# Patient Record
Sex: MALE | Race: WHITE | URBAN - METROPOLITAN AREA
[De-identification: names, ages, dates, MRNs, and addresses within clinical notes are randomized per-mention and may not be internally consistent; named-entity substitution may affect disease eponyms.]

---

## 2017-04-18 ENCOUNTER — AMBULATORY - HEALTHEAST (OUTPATIENT)
Dept: HEALTH INFORMATION MANAGEMENT | Facility: CLINIC | Age: 37
End: 2017-04-18

## 2017-04-20 ASSESSMENT — MIFFLIN-ST. JEOR: SCORE: 2673.73

## 2017-04-21 ENCOUNTER — ANESTHESIA - HEALTHEAST (OUTPATIENT)
Dept: SURGERY | Facility: HOSPITAL | Age: 37
End: 2017-04-21

## 2017-04-21 ENCOUNTER — SURGERY - HEALTHEAST (OUTPATIENT)
Dept: SURGERY | Facility: HOSPITAL | Age: 37
End: 2017-04-21

## 2017-11-25 ENCOUNTER — HOSPITAL ENCOUNTER (EMERGENCY)
Facility: CLINIC | Age: 37
Discharge: HOME | End: 2017-11-25
Attending: NURSE PRACTITIONER | Admitting: NURSE PRACTITIONER
Payer: COMMERCIAL

## 2017-11-25 ENCOUNTER — APPOINTMENT (OUTPATIENT)
Dept: GENERAL RADIOLOGY | Facility: CLINIC | Age: 37
End: 2017-11-25
Attending: NURSE PRACTITIONER
Payer: COMMERCIAL

## 2017-11-25 VITALS
RESPIRATION RATE: 16 BRPM | OXYGEN SATURATION: 97 % | DIASTOLIC BLOOD PRESSURE: 101 MMHG | SYSTOLIC BLOOD PRESSURE: 160 MMHG | TEMPERATURE: 98.2 F

## 2017-11-25 DIAGNOSIS — M79.662 PAIN OF LEFT LOWER LEG: ICD-10-CM

## 2017-11-25 PROCEDURE — 99213 OFFICE O/P EST LOW 20 MIN: CPT | Performed by: NURSE PRACTITIONER

## 2017-11-25 PROCEDURE — 99213 OFFICE O/P EST LOW 20 MIN: CPT

## 2017-11-25 PROCEDURE — 73590 X-RAY EXAM OF LOWER LEG: CPT | Mod: LT

## 2017-11-25 NOTE — DISCHARGE INSTRUCTIONS
Rest leg as much as possible.  Elevation.  Ice.  Ibuprofen 600mg every 6-8 hours as needed for pain. Take with food.  If not improving in 3-5 days you should have this rechecked, or sooner if pain becomes severe.

## 2017-11-25 NOTE — ED AVS SNAPSHOT
[facility] Emergency Department    [address] [facility] [address]    Phone:  [phone]    Fax:  [phone]                                       Jose Plummer   MRN: [id number]    Department:  [facility] Emergency Department   Date of Visit:  11/25/2017           Patient Information     Date Of Birth          1980        Your diagnoses for this visit were:     Pain of left lower leg        You were seen by Flor Gamino APRN CNP.      Follow-up Information     Follow up with Jun Thakkar    Specialty:  Internal Medicine    Why:  As needed    Contact information:    [facility]  [address]  [facility] [address]  [phone]          Follow up with [facility] Emergency Department.    Specialty:  EMERGENCY MEDICINE    Why:  If symptoms worsen    Contact information:    [address] [place] [address] [id number] [phone]    Additional information:    The medical center is located at   [address] [facility] ([address] in [place], [place] miles north   of [place]).        Discharge Instructions       Rest leg as much as possible.  Elevation.  Ice.  Ibuprofen 600mg every 6-8 hours as needed for pain. Take with food.  If not improving in 3-5 days you should have this rechecked, or sooner if pain becomes severe.      24 Hour Appointment Hotline       To make an appointment at any [facility] clinic, call [phone] ([phone]). If you don't have a family doctor or clinic, we will help you find one. [facility] clinics are conveniently located to serve the needs of you and your family.             Review of your medicines      Our records show that you are taking the medicines listed below. If these are incorrect, please call your family doctor or clinic.        Dose / Directions Last dose taken    * DULCOLAX STOOL SOFTENER 100 MG capsule   Dose:  100 mg   Generic drug:  docusate sodium        Take 100 mg by mouth 2 times  daily as needed.   Refills:  0        * COLACE 100 MG capsule   Generic drug:  docusate sodium        1 CAPSULE WITH EVERY VICODIN   Refills:  0        oxyCODONE-acetaminophen 5-325 MG per tablet   Commonly known as:  PERCOCET   Dose:  1-2 tablet   Quantity:  40 tablet        Take 1-2 tablets by mouth every 4 hours as needed for pain.   Refills:  0        PROCTOFOAM 1 % foam   Generic drug:  pramoxine        4 TIMES DAILY RECTALLY   Refills:  0        VICODIN ES 7.5-750 MG per tablet   Generic drug:  HYDROcodone-acetaminophen        1 TABLET EVERY 8 HOURS   Refills:  0        * Notice:  This list has 2 medication(s) that are the same as other medications prescribed for you. Read the directions carefully, and ask your doctor or other care provider to review them with you.            Procedures and tests performed during your visit     Tib/Fib XR, left      Orders Needing Specimen Collection     None      Pending Results     No orders found from 11/23/2017 to 11/26/2017.            Pending Culture Results     No orders found from 11/23/2017 to 11/26/2017.            Pending Results Instructions     If you had any lab results that were not finalized at the time of your Discharge, you can call the ED Lab Result RN at [phone]. You will be contacted by this team for any positive Lab results or changes in treatment. The nurses are available 7 days a week from 10A to 6:30P.  You can leave a message 24 hours per day and they will return your call.        Test Results From Your Hospital Stay        11/25/2017  2:18 PM      Narrative     XR TIBIA & FIBULA LT 2 VW 11/25/2017 2:11 PM     HISTORY: slid down fire pole, came down on left leg hard. pain  anterior-mid tib/fib;     COMPARISON: None        Impression     IMPRESSION: No acute fracture is seen. There is chronic appearing  deformity of the midshaft of the fibula likely due to old trauma.    ESTEFANI WOODWARD MD                Thank you for choosing [facility]       Thank  "you for choosing [facility] for your care. Our goal is always to provide you with excellent care. Hearing back from our patients is one way we can continue to improve our services. Please take a few minutes to complete the written survey that you may receive in the mail after you visit with us. Thank you!        [employer] Information     [employer] lets you send messages to your doctor, view your test results, renew your prescriptions, schedule appointments and more. To sign up, go to [email or web address] . Click on \"Log in\" on the left side of the screen, which will take you to the Welcome page. Then click on \"Sign up Now\" on the right side of the page.     You will be asked to enter the access code listed below, as well as some personal information. Please follow the directions to create your username and password.     Your access code is: [email or web address]  Expires: 2018  2:25 PM     Your access code will  in 90 days. If you need help or a new code, please call your [facility] clinic or [phone].        Care EveryWhere ID     This is your Care EveryWhere ID. This could be used by other organizations to access your [facility] medical records  [id number]        Equal Access to Services     REYNOLD REMY : Jude irizarryo Solesly, waaxda luqadaha, qaybta kaalmada adeegyada, sheila lópez. So [facility] [phone].    ATENCIÓN: Si habla español, tiene a mcgowan disposición servicios gratuitos de asistencia lingüística. Llame al [phone].    We comply with applicable federal civil rights laws and [place] laws. We do not discriminate on the basis of race, color, national origin, age, disability, sex, sexual orientation, or gender identity.            After Visit Summary       This is your record. Keep this with you and show to your community pharmacist(s) and doctor(s) at your next visit.                  "

## 2017-11-25 NOTE — ED PROVIDER NOTES
History     Chief Complaint   Patient presents with     Leg Pain     Pt sliding down fire pole last night and landed hard on L lower leg.       HPI  Jose Plummer is a 37 year old male who presents to urgent care for evaluation of pain in the mid shaft of his left lower leg.  Patient was sliding down a fire pole last night.  He describes having his right leg wrapped around the pole and when he slid down he landed hard with all of his weight onto his left leg.  He had immediate pain in the middle aspect of his left lower leg.  He has been able to bear weight since that time.  Patient has a history of a previous fracture to this leg several years ago.    Problem List:    There are no active problems to display for this patient.       Past Medical History:    No past medical history on file.    Past Surgical History:    No past surgical history on file.    Family History:    No family history on file.    Social History:  Marital Status:  Single [1]  Social History   Substance Use Topics     Smoking status: Not on file     Smokeless tobacco: Not on file     Alcohol use Not on file        Medications:      docusate sodium (DULCOLAX STOOL SOFTENER) 100 MG capsule   oxycodone-acetaminophen (PERCOCET) 5-325 MG per tablet   PROCTOFOAM 1 % RE FOAM   VICODIN ES 7.5-750 MG PO TABS   COLACE 100 MG PO CAPS         Review of Systems  As mentioned above in the history present illness. All other systems were reviewed and are negative.    Physical Exam   BP: (!) 160/101  Heart Rate: 98  Temp: 98.2  F (36.8  C)  Resp: 16  SpO2: 97 %      Physical Exam  Appearance: Alert, oriented, in no acute distress.  Left leg: No significant swelling, or overlying skin changes.  Tenderness over the anterior mid shaft (tib-fib).  No tenderness of the thigh, knee, ankle, or foot.  Normal range of motion.  Neurovascularly intact.  ED Course     ED Course     Procedures           Results for orders placed or performed during the hospital  encounter of 11/25/17 (from the past 24 hour(s))   Tib/Fib XR, left    Narrative    XR TIBIA & FIBULA LT 2 VW 11/25/2017 2:11 PM     HISTORY: slid down fire pole, came down on left leg hard. pain  anterior-mid tib/fib;     COMPARISON: None      Impression    IMPRESSION: No acute fracture is seen. There is chronic appearing  deformity of the midshaft of the fibula likely due to old trauma.    ESTEFANI WOODWARD MD       Labs Ordered and Resulted from Time of ED Arrival Up to the Time of Departure from the ED - No data to display    Assessments & Plan (with Medical Decision Making)   -X-ray negative for acute fracture, discuss imaging results with patient.  I recommend rest, elevation, ice, and ibuprofen.  If not improving in the next 3-5 days and recommend patient be reevaluated.  He should be evaluated sooner if he develops severe pain.  Patient is in agreement with plan.    I have reviewed the nursing notes.    I have reviewed the findings, diagnosis, plan and need for follow up with the patient.      Discharge Medication List as of 11/25/2017  2:25 PM          Final diagnoses:   Pain of left lower leg       11/25/2017   [facility] EMERGENCY DEPARTMENT     Flor Gamino, [place] CNP  11/25/17 1446

## 2017-11-25 NOTE — ED AVS SNAPSHOT
[facility] Emergency Department    [address] [facility] [address]    Phone:  [phone]    Fax:  [phone]                                       Jose Plummer   MRN: [id number]    Department:  [facility] Emergency Department   Date of Visit:  11/25/2017           After Visit Summary Signature Page     I have received my discharge instructions, and my questions have been answered. I have discussed any challenges I see with this plan with the nurse or doctor.    ..........................................................................................................................................  Patient/Patient Representative Signature      ..........................................................................................................................................  Patient Representative Print Name and Relationship to Patient    ..................................................               ................................................  Date                                            Time    ..........................................................................................................................................  Reviewed by Signature/Title    ...................................................              ..............................................  Date                                                            Time

## 2021-05-30 ENCOUNTER — RECORDS - HEALTHEAST (OUTPATIENT)
Dept: ADMINISTRATIVE | Facility: CLINIC | Age: 41
End: 2021-05-30

## 2021-05-30 VITALS — HEIGHT: 72 IN | WEIGHT: 315 LBS | BODY MASS INDEX: 42.66 KG/M2

## 2021-06-10 NOTE — ANESTHESIA PREPROCEDURE EVALUATION
Anesthesia Evaluation      Patient summary reviewed   No history of anesthetic complications     Airway   Mallampati: III  Neck ROM: full   Pulmonary - normal exam    breath sounds clear to auscultation  (+) sleep apnea, a smoker  (-) shortness of breath, recent URI                         Cardiovascular - normal exam  Exercise tolerance: > or = 4 METS  (-) angina  Rhythm: regular  Rate: normal,         Neuro/Psych - negative ROS   (-) no seizures, no neuromuscular disease, no CVA    Endo/Other    (+) obesity,   (-) no diabetes     GI/Hepatic/Renal            Dental - normal exam                        Anesthesia Plan  Planned anesthetic: general endotracheal  glidescope  ASA 3   Induction: intravenous   Anesthetic plan and risks discussed with: patient  Anesthesia plan special considerations: video-assisted, increased risk of difficult airway, antiemetics,   Post-op plan: routine recovery

## 2021-06-10 NOTE — ANESTHESIA CARE TRANSFER NOTE
Last vitals: 170/80, Resp-24, Temp-36.2,   Vitals:    04/21/17 1606   BP:    Pulse: 89   Resp:    Temp:    SpO2: 97%     Patient's level of consciousness is drowsy  Spontaneous respirations: yes  Maintains airway independently: yes  Dentition unchanged: yes  Oropharynx: oropharynx clear of all foreign objects    QCDR Measures:  ASA# 20 - Surgical Safety Checklist: ASA20A - Safety Checks Done  PQRS# 430 - Adult PONV Prevention: 4558F - Pt received => 2 anti-emetic agents (different classes) preop & intraop  ASA# 8 - Peds PONV Prevention: NA - Not pediatric patient, not GA or 2 or more risk factors NOT present  PQRS# 424 - Lexi-op Temp Management: 4559F - At least one body temp DOCUMENTED => 35.5C or 95.9F within required timeframe  PQRS# 426 - PACU Transfer Protocol: - Transfer of care checklist used  ASA# 14 - Acute Post-op Pain: ASA14B - Patient did NOT experience pain >= 7 out of 10    I completed my SBAR handoff to the receiving nurse per policy and procedure.

## 2021-06-10 NOTE — ANESTHESIA POSTPROCEDURE EVALUATION
Patient: Jose Plummer  FISTULA FISTULOTOMY AND CURRETAGE  Anesthesia type: general    Patient location: PACU  Last vitals:   Vitals:    04/21/17 1800   BP: 137/70   Pulse: 89   Resp:    Temp:    SpO2: 97%     Post vital signs: stable  Level of consciousness: awake and responds to simple questions  Post-anesthesia pain: pain controlled  Post-anesthesia nausea and vomiting: no  Pulmonary: unassisted, return to baseline  Cardiovascular: stable and blood pressure at baseline  Hydration: adequate  Anesthetic events: no    QCDR Measures:  ASA# 11 - Lexi-op Cardiac Arrest: ASA11B - Patient did NOT experience unanticipated cardiac arrest  ASA# 12 - Lexi-op Mortality Rate: ASA12B - Patient did NOT die  ASA# 13 - PACU Re-Intubation Rate: ASA13B - Patient did NOT require a new airway mgmt  ASA# 10 - Composite Anes Safety: ASA10A - No serious adverse event  ASA# 38 - New Corneal Injury: ASA38A - No new exposure keratitis or corneal abrasion in PACU    Additional Notes: O2 sat on RA normal for > one hour (high 90s). Pt wears his CPAP every night. OK for d/c with parents.